# Patient Record
Sex: MALE | Race: WHITE | Employment: OTHER | URBAN - METROPOLITAN AREA
[De-identification: names, ages, dates, MRNs, and addresses within clinical notes are randomized per-mention and may not be internally consistent; named-entity substitution may affect disease eponyms.]

---

## 2018-08-22 ENCOUNTER — HOSPITAL ENCOUNTER (EMERGENCY)
Age: 63
Discharge: HOME | End: 2018-08-22
Attending: EMERGENCY MEDICINE
Payer: MEDICARE

## 2018-08-22 ENCOUNTER — APPOINTMENT (OUTPATIENT)
Dept: GENERAL RADIOLOGY | Age: 63
End: 2018-08-22
Payer: MEDICARE

## 2018-08-22 VITALS
DIASTOLIC BLOOD PRESSURE: 80 MMHG | BODY MASS INDEX: 19.46 KG/M2 | RESPIRATION RATE: 16 BRPM | WEIGHT: 124 LBS | SYSTOLIC BLOOD PRESSURE: 146 MMHG | HEIGHT: 67 IN | TEMPERATURE: 98.5 F | OXYGEN SATURATION: 95 % | HEART RATE: 73 BPM

## 2018-08-22 DIAGNOSIS — R10.13 ABDOMINAL PAIN, EPIGASTRIC: Primary | ICD-10-CM

## 2018-08-22 LAB
A/G RATIO: 1.2 (ref 1.1–2.2)
ALBUMIN SERPL-MCNC: 4.5 G/DL (ref 3.4–5)
ALP BLD-CCNC: 106 U/L (ref 40–129)
ALT SERPL-CCNC: <5 U/L (ref 10–40)
ANION GAP SERPL CALCULATED.3IONS-SCNC: 14 MMOL/L (ref 3–16)
AST SERPL-CCNC: 10 U/L (ref 15–37)
BASOPHILS ABSOLUTE: 0.2 K/UL (ref 0–0.2)
BASOPHILS RELATIVE PERCENT: 1 %
BILIRUB SERPL-MCNC: <0.2 MG/DL (ref 0–1)
BILIRUBIN URINE: NEGATIVE
BLOOD, URINE: ABNORMAL
BUN BLDV-MCNC: 17 MG/DL (ref 7–20)
CALCIUM SERPL-MCNC: 9.1 MG/DL (ref 8.3–10.6)
CHLORIDE BLD-SCNC: 103 MMOL/L (ref 99–110)
CLARITY: CLEAR
CO2: 22 MMOL/L (ref 21–32)
COLOR: YELLOW
CREAT SERPL-MCNC: 0.7 MG/DL (ref 0.8–1.3)
EOSINOPHILS ABSOLUTE: 0 K/UL (ref 0–0.6)
EOSINOPHILS RELATIVE PERCENT: 0 %
EPITHELIAL CELLS, UA: ABNORMAL /HPF
GFR AFRICAN AMERICAN: >60
GFR NON-AFRICAN AMERICAN: >60
GLOBULIN: 3.7 G/DL
GLUCOSE BLD-MCNC: 122 MG/DL (ref 70–99)
GLUCOSE URINE: NEGATIVE MG/DL
HCT VFR BLD CALC: 44 % (ref 40.5–52.5)
HEMOGLOBIN: 14.8 G/DL (ref 13.5–17.5)
KETONES, URINE: NEGATIVE MG/DL
LEUKOCYTE ESTERASE, URINE: NEGATIVE
LIPASE: 17 U/L (ref 13–60)
LYMPHOCYTES ABSOLUTE: 2.9 K/UL (ref 1–5.1)
LYMPHOCYTES RELATIVE PERCENT: 19.4 %
MCH RBC QN AUTO: 29.4 PG (ref 26–34)
MCHC RBC AUTO-ENTMCNC: 33.6 G/DL (ref 31–36)
MCV RBC AUTO: 87.3 FL (ref 80–100)
MICROSCOPIC EXAMINATION: YES
MONOCYTES ABSOLUTE: 0.7 K/UL (ref 0–1.3)
MONOCYTES RELATIVE PERCENT: 4.7 %
MUCUS: ABNORMAL /LPF
NEUTROPHILS ABSOLUTE: 11.1 K/UL (ref 1.7–7.7)
NEUTROPHILS RELATIVE PERCENT: 74.9 %
NITRITE, URINE: NEGATIVE
PDW BLD-RTO: 15.7 % (ref 12.4–15.4)
PH UA: 6
PLATELET # BLD: 376 K/UL (ref 135–450)
PMV BLD AUTO: 6.5 FL (ref 5–10.5)
POTASSIUM SERPL-SCNC: 3.5 MMOL/L (ref 3.5–5.1)
PROTEIN UA: ABNORMAL MG/DL
RBC # BLD: 5.04 M/UL (ref 4.2–5.9)
RBC UA: ABNORMAL /HPF (ref 0–2)
SODIUM BLD-SCNC: 139 MMOL/L (ref 136–145)
SPECIFIC GRAVITY UA: >=1.03
TOTAL PROTEIN: 8.2 G/DL (ref 6.4–8.2)
TROPONIN: <0.01 NG/ML
URINE REFLEX TO CULTURE: ABNORMAL
URINE TYPE: ABNORMAL
UROBILINOGEN, URINE: 0.2 E.U./DL
WBC # BLD: 14.8 K/UL (ref 4–11)
WBC UA: ABNORMAL /HPF (ref 0–5)

## 2018-08-22 PROCEDURE — 93005 ELECTROCARDIOGRAM TRACING: CPT | Performed by: NURSE PRACTITIONER

## 2018-08-22 PROCEDURE — 2580000003 HC RX 258: Performed by: NURSE PRACTITIONER

## 2018-08-22 PROCEDURE — 84484 ASSAY OF TROPONIN QUANT: CPT

## 2018-08-22 PROCEDURE — 96361 HYDRATE IV INFUSION ADD-ON: CPT

## 2018-08-22 PROCEDURE — 71046 X-RAY EXAM CHEST 2 VIEWS: CPT

## 2018-08-22 PROCEDURE — S0028 INJECTION, FAMOTIDINE, 20 MG: HCPCS | Performed by: NURSE PRACTITIONER

## 2018-08-22 PROCEDURE — 96375 TX/PRO/DX INJ NEW DRUG ADDON: CPT

## 2018-08-22 PROCEDURE — 85025 COMPLETE CBC W/AUTO DIFF WBC: CPT

## 2018-08-22 PROCEDURE — 6360000002 HC RX W HCPCS: Performed by: NURSE PRACTITIONER

## 2018-08-22 PROCEDURE — 83690 ASSAY OF LIPASE: CPT

## 2018-08-22 PROCEDURE — 96374 THER/PROPH/DIAG INJ IV PUSH: CPT

## 2018-08-22 PROCEDURE — 99284 EMERGENCY DEPT VISIT MOD MDM: CPT

## 2018-08-22 PROCEDURE — 81001 URINALYSIS AUTO W/SCOPE: CPT

## 2018-08-22 PROCEDURE — 80053 COMPREHEN METABOLIC PANEL: CPT

## 2018-08-22 PROCEDURE — 2500000003 HC RX 250 WO HCPCS: Performed by: NURSE PRACTITIONER

## 2018-08-22 RX ORDER — 0.9 % SODIUM CHLORIDE 0.9 %
1000 INTRAVENOUS SOLUTION INTRAVENOUS ONCE
Status: COMPLETED | OUTPATIENT
Start: 2018-08-22 | End: 2018-08-22

## 2018-08-22 RX ORDER — SUCRALFATE 1 G/1
1 TABLET ORAL 4 TIMES DAILY
Qty: 120 TABLET | Refills: 3 | Status: SHIPPED | OUTPATIENT
Start: 2018-08-22

## 2018-08-22 RX ORDER — ONDANSETRON 4 MG/1
4 TABLET, FILM COATED ORAL EVERY 8 HOURS PRN
Qty: 20 TABLET | Refills: 0 | Status: SHIPPED | OUTPATIENT
Start: 2018-08-22

## 2018-08-22 RX ORDER — DICYCLOMINE HYDROCHLORIDE 10 MG/1
10 CAPSULE ORAL
Qty: 120 CAPSULE | Refills: 3 | Status: SHIPPED | OUTPATIENT
Start: 2018-08-22

## 2018-08-22 RX ORDER — METOCLOPRAMIDE HYDROCHLORIDE 5 MG/ML
10 INJECTION INTRAMUSCULAR; INTRAVENOUS ONCE
Status: COMPLETED | OUTPATIENT
Start: 2018-08-22 | End: 2018-08-22

## 2018-08-22 RX ADMIN — SODIUM CHLORIDE 1000 ML: 9 INJECTION, SOLUTION INTRAVENOUS at 20:02

## 2018-08-22 RX ADMIN — FAMOTIDINE 20 MG: 10 INJECTION, SOLUTION INTRAVENOUS at 20:03

## 2018-08-22 RX ADMIN — METOCLOPRAMIDE 10 MG: 5 INJECTION, SOLUTION INTRAMUSCULAR; INTRAVENOUS at 20:03

## 2018-08-22 ASSESSMENT — PAIN SCALES - GENERAL: PAINLEVEL_OUTOF10: 9

## 2018-08-22 ASSESSMENT — PAIN DESCRIPTION - LOCATION: LOCATION: ABDOMEN

## 2018-08-22 ASSESSMENT — PAIN DESCRIPTION - FREQUENCY: FREQUENCY: CONTINUOUS

## 2018-08-22 ASSESSMENT — ENCOUNTER SYMPTOMS
DIARRHEA: 1
SHORTNESS OF BREATH: 0
NAUSEA: 1
ABDOMINAL PAIN: 1
VOMITING: 1

## 2018-08-22 ASSESSMENT — PAIN DESCRIPTION - DESCRIPTORS: DESCRIPTORS: CONSTANT;PRESSURE

## 2018-08-22 ASSESSMENT — PAIN DESCRIPTION - PAIN TYPE: TYPE: ACUTE PAIN

## 2018-08-23 LAB
EKG ATRIAL RATE: 71 BPM
EKG DIAGNOSIS: NORMAL
EKG P AXIS: 7 DEGREES
EKG P-R INTERVAL: 166 MS
EKG Q-T INTERVAL: 416 MS
EKG QRS DURATION: 86 MS
EKG QTC CALCULATION (BAZETT): 452 MS
EKG R AXIS: 40 DEGREES
EKG T AXIS: 63 DEGREES
EKG VENTRICULAR RATE: 71 BPM

## 2018-08-23 PROCEDURE — 93010 ELECTROCARDIOGRAM REPORT: CPT | Performed by: INTERNAL MEDICINE

## 2018-08-23 NOTE — ED PROVIDER NOTES
Emergency Department Physician who either signs or Co-signs this chart in the absence of a cardiologist.  Please see their note for interpretation of EKG. RADIOLOGY:   Non-plain film images such as CT, Ultrasound and MRI are read by the radiologist. Plain radiographic images are visualized and preliminarily interpreted by the  ED Provider with the below findings:    Chest x-ray interpreted by radiologist for chronic interstitial opacities are identified. No acute focal infiltrate is found. No pneumothorax. No free air. No acute bony abnormality. Heart and mediastinal contours are unremarkable. Interpretation per the Radiologist below, if available at the time of this note:    XR CHEST STANDARD (2 VW)   Final Result   No acute abnormality detected. No results found. PROCEDURES   Unless otherwise noted below, none     Procedures    CRITICAL CARE TIME   N/A    CONSULTS:  None      EMERGENCY DEPARTMENT COURSE and DIFFERENTIAL DIAGNOSIS/MDM:   Vitals:    Vitals:    08/22/18 1856   BP: (!) 146/80   Pulse: 73   Resp: 16   Temp: 98.5 °F (36.9 °C)   TempSrc: Oral   SpO2: 95%   Weight: 124 lb (56.2 kg)   Height: 5' 6.5\" (1.689 m)       Patient was given the following medications:  Medications   metoclopramide (REGLAN) injection 10 mg (10 mg Intravenous Given 8/22/18 2003)   famotidine (PEPCID) injection 20 mg (20 mg Intravenous Given 8/22/18 2003)   0.9 % sodium chloride bolus (0 mLs Intravenous Stopped 8/22/18 2102)       Patient was seen and evaluated by myself and Dr. Linda Armendariz. Patient here today for abdominal pain. Patient reports that his belly pain started at 1:30 this afternoon. He denies any fever but states he has been nauseous and vomited twice. Patient also reports it is been out of his methadone for the last 6 days. On exam he is awake and alert hemodynamically stable nontoxic in appearance. Lab values have all been reviewed and interpreted.   Patient did have a small amount of hematuria. Patient will be discharged home with epigastric discomfort. He was given IV fluids Reglan and Pepcid in the ED. He will be discharged home with Bentyl and Zofran and Carafate. Patient is frightened with the PCP referral and a GI referral.  He is encouraged to follow up and establish care at both. He was also encouraged to return the ED for any worsening symptoms. Patient was discharged home with all questions answered. The patient tolerated their visit well. They were seen and evaluated by the attending physician, No att. providers found who agreed with the assessment and plan. The patient and / or the family were informed of the results of any tests, a time was given to answer questions, a plan was proposed and they agreed with plan. FINAL IMPRESSION      1.  Abdominal pain, epigastric          DISPOSITION/PLAN   DISPOSITION Decision To Discharge 08/22/2018 10:21:55 PM      PATIENT REFERRED TO:  Benito Ayala  [phone]  Schedule an appointment as soon as possible for a visit in 1 week  to establish primary care    [facility] ED  [address]  [phone]    If symptoms worsen    Emilie Marquez MD  [address], [address] [facility] [address] [place] [address]    Schedule an appointment as soon as possible for a visit in 1 week  for re-evaluation      DISCHARGE MEDICATIONS:  Discharge Medication List as of 8/22/2018  9:22 PM      START taking these medications    Details   dicyclomine (BENTYL) 10 MG capsule Take 1 capsule by mouth 4 times daily (before meals and nightly), Disp-120 capsule, R-3Print      ondansetron (ZOFRAN) 4 MG tablet Take 1 tablet by mouth every 8 hours as needed for Nausea, Disp-20 tablet, R-0Print      sucralfate (CARAFATE) 1 GM tablet Take 1 tablet by mouth 4 times daily, Disp-120 tablet, R-3Print             DISCONTINUED MEDICATIONS:  Discharge Medication List as of 8/22/2018  9:22 PM                 (Please note that portions of this note were completed with a voice recognition program.  Efforts were made to edit the dictations but occasionally words are mis-transcribed.)    TADEO Carter CNP (electronically signed)     TADEO Carter CNP  08/23/18 5528

## 2018-08-23 NOTE — ED PROVIDER NOTES
I have personally performed and/or participated in the history, exam and medical decision making and agree with all pertinent clinical information. I have also reviewed and agree with the past medical, family and social history unless otherwise noted.      Bertram Gonzalez MD  08/23/18 4643

## 2022-10-05 ENCOUNTER — HOSPITAL ENCOUNTER (OUTPATIENT)
Dept: GENERAL RADIOLOGY | Age: 67
Discharge: HOME | End: 2022-10-05
Payer: MEDICARE

## 2022-10-05 ENCOUNTER — HOSPITAL ENCOUNTER (OUTPATIENT)
Age: 67
Discharge: HOME | End: 2022-10-05
Payer: MEDICARE

## 2022-10-05 DIAGNOSIS — R06.02 SOB (SHORTNESS OF BREATH): ICD-10-CM

## 2022-10-05 PROCEDURE — 71046 X-RAY EXAM CHEST 2 VIEWS: CPT

## 2022-12-24 ENCOUNTER — APPOINTMENT (OUTPATIENT)
Dept: CT IMAGING | Age: 67
End: 2022-12-24
Payer: MEDICARE

## 2022-12-24 ENCOUNTER — HOSPITAL ENCOUNTER (EMERGENCY)
Age: 67
Discharge: ANOTHER HOSPITAL | End: 2022-12-24
Attending: STUDENT IN AN ORGANIZED HEALTH CARE EDUCATION/TRAINING PROGRAM
Payer: MEDICARE

## 2022-12-24 ENCOUNTER — ANCILLARY PROCEDURE (OUTPATIENT)
Dept: EMERGENCY DEPT | Age: 67
End: 2022-12-24
Payer: MEDICARE

## 2022-12-24 ENCOUNTER — APPOINTMENT (OUTPATIENT)
Dept: GENERAL RADIOLOGY | Age: 67
End: 2022-12-24
Payer: MEDICARE

## 2022-12-24 VITALS
TEMPERATURE: 97.6 F | DIASTOLIC BLOOD PRESSURE: 65 MMHG | WEIGHT: 130 LBS | HEIGHT: 65 IN | OXYGEN SATURATION: 99 % | SYSTOLIC BLOOD PRESSURE: 103 MMHG | RESPIRATION RATE: 15 BRPM | HEART RATE: 77 BPM | BODY MASS INDEX: 21.66 KG/M2

## 2022-12-24 DIAGNOSIS — R74.8 ELEVATED CK: ICD-10-CM

## 2022-12-24 DIAGNOSIS — S22.20XA CLOSED FRACTURE OF STERNUM, UNSPECIFIED PORTION OF STERNUM, INITIAL ENCOUNTER: ICD-10-CM

## 2022-12-24 DIAGNOSIS — R77.8 ELEVATED TROPONIN: ICD-10-CM

## 2022-12-24 DIAGNOSIS — R79.89 ELEVATED BRAIN NATRIURETIC PEPTIDE (BNP) LEVEL: ICD-10-CM

## 2022-12-24 DIAGNOSIS — S22.000A COMPRESSION FX, THORACIC SPINE, CLOSED, INITIAL ENCOUNTER (HCC): ICD-10-CM

## 2022-12-24 DIAGNOSIS — E16.2 HYPOGLYCEMIA: ICD-10-CM

## 2022-12-24 DIAGNOSIS — R07.9 CHEST PAIN, UNSPECIFIED TYPE: ICD-10-CM

## 2022-12-24 DIAGNOSIS — R74.01 TRANSAMINITIS: ICD-10-CM

## 2022-12-24 DIAGNOSIS — S22.42XA CLOSED FRACTURE OF MULTIPLE RIBS OF LEFT SIDE, INITIAL ENCOUNTER: Primary | ICD-10-CM

## 2022-12-24 LAB
A/G RATIO: 1 (ref 1.1–2.2)
ALBUMIN SERPL-MCNC: 3.2 G/DL (ref 3.4–5)
ALP BLD-CCNC: 110 U/L (ref 40–129)
ALT SERPL-CCNC: 295 U/L (ref 10–40)
ANION GAP SERPL CALCULATED.3IONS-SCNC: 11 MMOL/L (ref 3–16)
AST SERPL-CCNC: 238 U/L (ref 15–37)
BASE EXCESS VENOUS: 3.6 MMOL/L (ref -3–3)
BASOPHILS ABSOLUTE: 0.1 K/UL (ref 0–0.2)
BASOPHILS RELATIVE PERCENT: 0.6 %
BILIRUB SERPL-MCNC: 0.9 MG/DL (ref 0–1)
BUN BLDV-MCNC: 33 MG/DL (ref 7–20)
CALCIUM SERPL-MCNC: 8.7 MG/DL (ref 8.3–10.6)
CARBOXYHEMOGLOBIN: 6 % (ref 0–1.5)
CHLORIDE BLD-SCNC: 103 MMOL/L (ref 99–110)
CO2: 30 MMOL/L (ref 21–32)
CREAT SERPL-MCNC: 0.9 MG/DL (ref 0.8–1.3)
EOSINOPHILS ABSOLUTE: 0 K/UL (ref 0–0.6)
EOSINOPHILS RELATIVE PERCENT: 0.2 %
ETHANOL: NORMAL MG/DL (ref 0–0.08)
GFR SERPL CREATININE-BSD FRML MDRD: >60 ML/MIN/{1.73_M2}
GLUCOSE BLD-MCNC: 120 MG/DL
GLUCOSE BLD-MCNC: 120 MG/DL (ref 70–99)
GLUCOSE BLD-MCNC: 61 MG/DL (ref 70–99)
HCO3 VENOUS: 28.3 MMOL/L (ref 23–29)
HCT VFR BLD CALC: 38.8 % (ref 40.5–52.5)
HEMOGLOBIN: 12.2 G/DL (ref 13.5–17.5)
INFLUENZA A: NOT DETECTED
INFLUENZA B: NOT DETECTED
LACTIC ACID: 1.5 MMOL/L (ref 0.4–2)
LYMPHOCYTES ABSOLUTE: 1.2 K/UL (ref 1–5.1)
LYMPHOCYTES RELATIVE PERCENT: 11.2 %
MAGNESIUM: 1.5 MG/DL (ref 1.8–2.4)
MCH RBC QN AUTO: 28.1 PG (ref 26–34)
MCHC RBC AUTO-ENTMCNC: 31.5 G/DL (ref 31–36)
MCV RBC AUTO: 89.2 FL (ref 80–100)
METHEMOGLOBIN VENOUS: 0.3 %
MONOCYTES ABSOLUTE: 0.6 K/UL (ref 0–1.3)
MONOCYTES RELATIVE PERCENT: 5.5 %
NEUTROPHILS ABSOLUTE: 9.1 K/UL (ref 1.7–7.7)
NEUTROPHILS RELATIVE PERCENT: 82.5 %
O2 CONTENT, VEN: 15 VOL %
O2 SAT, VEN: 90 %
O2 THERAPY: ABNORMAL
PCO2, VEN: 43.1 MMHG (ref 40–50)
PDW BLD-RTO: 17.1 % (ref 12.4–15.4)
PERFORMED ON: ABNORMAL
PH VENOUS: 7.43 (ref 7.35–7.45)
PHOSPHORUS: 2.1 MG/DL (ref 2.5–4.9)
PLATELET # BLD: 208 K/UL (ref 135–450)
PMV BLD AUTO: 7 FL (ref 5–10.5)
PO2, VEN: 55.2 MMHG (ref 25–40)
POTASSIUM REFLEX MAGNESIUM: 3.6 MMOL/L (ref 3.5–5.1)
PRO-BNP: ABNORMAL PG/ML (ref 0–124)
RBC # BLD: 4.35 M/UL (ref 4.2–5.9)
SARS-COV-2 RNA, RT PCR: NOT DETECTED
SODIUM BLD-SCNC: 144 MMOL/L (ref 136–145)
TCO2 CALC VENOUS: 30 MMOL/L
TOTAL CK: 1041 U/L (ref 39–308)
TOTAL PROTEIN: 6.3 G/DL (ref 6.4–8.2)
TROPONIN: 0.17 NG/ML
TROPONIN: 0.17 NG/ML
TSH SERPL DL<=0.05 MIU/L-ACNC: 4.25 UIU/ML (ref 0.27–4.2)
WBC # BLD: 11 K/UL (ref 4–11)

## 2022-12-24 PROCEDURE — 70450 CT HEAD/BRAIN W/O DYE: CPT

## 2022-12-24 PROCEDURE — 6360000004 HC RX CONTRAST MEDICATION: Performed by: STUDENT IN AN ORGANIZED HEALTH CARE EDUCATION/TRAINING PROGRAM

## 2022-12-24 PROCEDURE — 84100 ASSAY OF PHOSPHORUS: CPT

## 2022-12-24 PROCEDURE — 82550 ASSAY OF CK (CPK): CPT

## 2022-12-24 PROCEDURE — 72131 CT LUMBAR SPINE W/O DYE: CPT

## 2022-12-24 PROCEDURE — 93005 ELECTROCARDIOGRAM TRACING: CPT | Performed by: STUDENT IN AN ORGANIZED HEALTH CARE EDUCATION/TRAINING PROGRAM

## 2022-12-24 PROCEDURE — 72128 CT CHEST SPINE W/O DYE: CPT

## 2022-12-24 PROCEDURE — 85025 COMPLETE CBC W/AUTO DIFF WBC: CPT

## 2022-12-24 PROCEDURE — 87636 SARSCOV2 & INF A&B AMP PRB: CPT

## 2022-12-24 PROCEDURE — 84443 ASSAY THYROID STIM HORMONE: CPT

## 2022-12-24 PROCEDURE — 96375 TX/PRO/DX INJ NEW DRUG ADDON: CPT

## 2022-12-24 PROCEDURE — 83605 ASSAY OF LACTIC ACID: CPT

## 2022-12-24 PROCEDURE — 71260 CT THORAX DX C+: CPT | Performed by: STUDENT IN AN ORGANIZED HEALTH CARE EDUCATION/TRAINING PROGRAM

## 2022-12-24 PROCEDURE — 74177 CT ABD & PELVIS W/CONTRAST: CPT

## 2022-12-24 PROCEDURE — 36415 COLL VENOUS BLD VENIPUNCTURE: CPT

## 2022-12-24 PROCEDURE — 80053 COMPREHEN METABOLIC PANEL: CPT

## 2022-12-24 PROCEDURE — 6370000000 HC RX 637 (ALT 250 FOR IP): Performed by: STUDENT IN AN ORGANIZED HEALTH CARE EDUCATION/TRAINING PROGRAM

## 2022-12-24 PROCEDURE — 71101 X-RAY EXAM UNILAT RIBS/CHEST: CPT

## 2022-12-24 PROCEDURE — 84484 ASSAY OF TROPONIN QUANT: CPT

## 2022-12-24 PROCEDURE — 6360000002 HC RX W HCPCS: Performed by: STUDENT IN AN ORGANIZED HEALTH CARE EDUCATION/TRAINING PROGRAM

## 2022-12-24 PROCEDURE — 96366 THER/PROPH/DIAG IV INF ADDON: CPT

## 2022-12-24 PROCEDURE — 83880 ASSAY OF NATRIURETIC PEPTIDE: CPT

## 2022-12-24 PROCEDURE — 82803 BLOOD GASES ANY COMBINATION: CPT

## 2022-12-24 PROCEDURE — 2580000003 HC RX 258: Performed by: STUDENT IN AN ORGANIZED HEALTH CARE EDUCATION/TRAINING PROGRAM

## 2022-12-24 PROCEDURE — 80074 ACUTE HEPATITIS PANEL: CPT

## 2022-12-24 PROCEDURE — 96365 THER/PROPH/DIAG IV INF INIT: CPT

## 2022-12-24 PROCEDURE — 82077 ASSAY SPEC XCP UR&BREATH IA: CPT

## 2022-12-24 PROCEDURE — 93308 TTE F-UP OR LMTD: CPT

## 2022-12-24 PROCEDURE — 83735 ASSAY OF MAGNESIUM: CPT

## 2022-12-24 PROCEDURE — 99285 EMERGENCY DEPT VISIT HI MDM: CPT

## 2022-12-24 RX ORDER — MAGNESIUM SULFATE 1 G/100ML
1000 INJECTION INTRAVENOUS ONCE
Status: COMPLETED | OUTPATIENT
Start: 2022-12-24 | End: 2022-12-24

## 2022-12-24 RX ORDER — LIDOCAINE 4 G/G
1 PATCH TOPICAL ONCE
Status: DISCONTINUED | OUTPATIENT
Start: 2022-12-24 | End: 2022-12-24 | Stop reason: HOSPADM

## 2022-12-24 RX ORDER — ASPIRIN 325 MG
325 TABLET ORAL ONCE
Status: COMPLETED | OUTPATIENT
Start: 2022-12-24 | End: 2022-12-24

## 2022-12-24 RX ORDER — TIZANIDINE 4 MG/1
10 TABLET ORAL EVERY 6 HOURS PRN
COMMUNITY

## 2022-12-24 RX ORDER — DEXTROSE MONOHYDRATE 25 G/50ML
12.5 INJECTION, SOLUTION INTRAVENOUS ONCE
Status: DISCONTINUED | OUTPATIENT
Start: 2022-12-24 | End: 2022-12-24

## 2022-12-24 RX ORDER — MORPHINE SULFATE 2 MG/ML
2 INJECTION, SOLUTION INTRAMUSCULAR; INTRAVENOUS ONCE
Status: COMPLETED | OUTPATIENT
Start: 2022-12-24 | End: 2022-12-24

## 2022-12-24 RX ADMIN — MORPHINE SULFATE 2 MG: 2 INJECTION, SOLUTION INTRAMUSCULAR; INTRAVENOUS at 17:42

## 2022-12-24 RX ADMIN — ASPIRIN 325 MG: 325 TABLET ORAL at 15:38

## 2022-12-24 RX ADMIN — IOPAMIDOL 85 ML: 755 INJECTION, SOLUTION INTRAVENOUS at 15:19

## 2022-12-24 RX ADMIN — MAGNESIUM SULFATE HEPTAHYDRATE 1000 MG: 1 INJECTION, SOLUTION INTRAVENOUS at 15:38

## 2022-12-24 RX ADMIN — DEXTROSE MONOHYDRATE 125 ML: 100 INJECTION, SOLUTION INTRAVENOUS at 15:35

## 2022-12-24 ASSESSMENT — PAIN - FUNCTIONAL ASSESSMENT: PAIN_FUNCTIONAL_ASSESSMENT: 0-10

## 2022-12-24 ASSESSMENT — PAIN DESCRIPTION - LOCATION
LOCATION: RIB CAGE
LOCATION: RIB CAGE
LOCATION: BACK

## 2022-12-24 ASSESSMENT — PAIN SCALES - GENERAL
PAINLEVEL_OUTOF10: 9
PAINLEVEL_OUTOF10: 8
PAINLEVEL_OUTOF10: 8

## 2022-12-24 ASSESSMENT — PAIN DESCRIPTION - DESCRIPTORS
DESCRIPTORS: SHOOTING
DESCRIPTORS: STABBING
DESCRIPTORS: STABBING

## 2022-12-24 ASSESSMENT — PAIN DESCRIPTION - ORIENTATION
ORIENTATION: LEFT
ORIENTATION: LEFT

## 2022-12-24 ASSESSMENT — PAIN DESCRIPTION - PAIN TYPE
TYPE: ACUTE PAIN
TYPE: ACUTE PAIN

## 2022-12-24 ASSESSMENT — PAIN DESCRIPTION - FREQUENCY: FREQUENCY: CONTINUOUS

## 2022-12-24 ASSESSMENT — LIFESTYLE VARIABLES: HOW OFTEN DO YOU HAVE A DRINK CONTAINING ALCOHOL: NEVER

## 2022-12-24 NOTE — PROGRESS NOTES
Called regarding this patient's care by Dr. Rani Gutierrez of the ED. Patient was having falls at home. He had complained of chest pain. Troponin found to be elevated at 0.17. Abnormal EKG. On review of EKG the patient appears to have a prior anterior infarct pattern with Q waves across the precordial leads and ST elevations on serial EKG. No reciprocal changes noted. On review of 2018 EKG, prior anterior infarct/Q waves across the anterior precordial leads was also noted. Pan scan shows concern for T-spine compression fractures, rib fractures, sternal fracture. I am told history is consistent with musculoskeletal pain and is reproducible on exam.  Patient is denying chest pressure/heaviness/tightness. We do not have repeat troponin as yet at this time. CPK is elevated, liver enzymes elevated. I have discussed EKGs and course with interventional cardiology. We feel that patient is not appropriate for Cath Lab at this time and that acute MI is unlikely given course to this point. I would withhold heparin at this point given trauma. He was given aspirin. Plan is transfer to [facility] for further evaluation for trauma. He may be assessed by cardiology group there as ischemic work-up would be indicated upon recovery. Please call with any additional questions or concerns.     Abilio Ashraf MD, [address] [facility]  [phone] [facility]  [phone] [facility]

## 2022-12-24 NOTE — ED PROVIDER NOTES
[address] ED      CHIEF COMPLAINT  Fatigue, falls       HISTORY OF PRESENT ILLNESS  Jane Simeon is a 75 y.o. male with a past medical history of arthritis and fibromyalgia, who presents to the ED for evaluation of fatigue and multiple falls. Patient reports he has had 4 falls over the past 6 weeks. He cannot provide an exact reason for these falls, states it is due to weakness. Patient only complaint of trauma is to his left ribs, he states that his ribs have hurt since a fall 2 weeks ago. He denies that he was on the ground for any extended period of time. He denies any falls since the fall 2 weeks ago. Reports aching pain, severe, worse with touching. Nonpleuritic. He did hit his head on a previous fall, denies loss of consciousness or blood thinner use. No vomiting. When EMS arrived to his house, the home was very cold, patient is reportedly not had heat for multiple days and only has a small space heater. There is also significant concern by EMS due to the house condition and the condition of the patient's pet-they will be contacting Adult Protective Services. Patient lives alone. He denies any shortness of breath, cough, fever, vomiting, dysuria, abdominal pain. Patient has not eaten in 3 days. Old records reviewed:  Patient does follow with [facility] for thoracic back pain    No other complaints, modifying factors or associated symptoms. I have reviewed the following from the nursing documentation. Past Medical History:   Diagnosis Date    Arthritis     Fibromyalgia      Past Surgical History:   Procedure Laterality Date    ADENOIDECTOMY      CARPAL TUNNEL RELEASE Bilateral     SHOULDER SURGERY Bilateral     TONSILLECTOMY       No family history on file.   Social History     Socioeconomic History    Marital status:      Spouse name: Not on file    Number of children: Not on file    Years of education: Not on file    Highest education level: Not on file Occupational History    Not on file   Tobacco Use    Smoking status: Every Day     Packs/day: 0.50     Years: 45.00     Pack years: 22.50     Types: Cigarettes    Smokeless tobacco: Never   Substance and Sexual Activity    Alcohol use: No    Drug use: No    Sexual activity: Not on file   Other Topics Concern    Not on file   Social History Narrative    Not on file     Social Determinants of Health     Financial Resource Strain: Not on file   Food Insecurity: Not on file   Transportation Needs: Not on file   Physical Activity: Not on file   Stress: Not on file   Social Connections: Not on file   Intimate Partner Violence: Not on file   Housing Stability: Not on file     Current Facility-Administered Medications   Medication Dose Route Frequency Provider Last Rate Last Admin    sodium phosphate 9.45 mmol in sodium chloride 0.9 % 250 mL IVPB  0.16 mmol/kg IntraVENous PRN Natalie Santana MD        Or    sodium phosphate 18.87 mmol in sodium chloride 0.9 % 250 mL IVPB  0.32 mmol/kg IntraVENous PRN Natalie Santana MD        lidocaine 4 % external patch 1 patch  1 patch TransDERmal Once Natalie Santana MD        morphine (PF) injection 2 mg  2 mg IntraVENous Once Natalie Santana MD         Current Outpatient Medications   Medication Sig Dispense Refill    tiZANidine (ZANAFLEX) 4 MG tablet Take 10 mg by mouth every 6 hours as needed      dicyclomine (BENTYL) 10 MG capsule Take 1 capsule by mouth 4 times daily (before meals and nightly) (Patient not taking: Reported on 12/24/2022) 120 capsule 3    ondansetron (ZOFRAN) 4 MG tablet Take 1 tablet by mouth every 8 hours as needed for Nausea 20 tablet 0    sucralfate (CARAFATE) 1 GM tablet Take 1 tablet by mouth 4 times daily (Patient not taking: Reported on 12/24/2022) 120 tablet 3    methadone (DOLOPHINE) 10 MG tablet Take 10 mg by mouth three times daily      ibuprofen (ADVIL;MOTRIN) 200 MG tablet Take 200 mg by mouth every 6 hours as needed for Pain      ibuprofen (ADVIL;MOTRIN) 800 MG tablet Take 1 tablet by mouth every 8 hours as needed for Pain 30 tablet 0     No Known Allergies    REVIEW OF SYSTEMS  All systems reviewed, pertinent positives per HPI otherwise noted to be negative. PHYSICAL EXAM  GENERAL APPEARANCE: Elzbieta Salazar is in no acute respiratory distress. Awake and alert. Ill-appearing. Answers questions appropriately. Generalized weakness, has difficulty lifting himself off the bed  VITAL SIGNS: /79   Pulse 97   Temp 97.6 °F (36.4 °C) (Oral)   Resp 16   Ht 5' 5\" (1.651 m)   Wt 130 lb (59 kg)   SpO2 94%   BMI 21.63 kg/m²   HEENT: Normocephalic, atraumatic. No Mijaress sign or Raccoon Eyes. No depressed skull fractures. Extraocular muscles are intact. Pupils equal round and reactive to light. Conjunctivas are pink. Negative scleral icterus. No epistaxis. No septal hematomas. No hemotympanum. Mucous membranes are moist. Tongue in the midline. Pharynx without erythema or exudates. No uvular deviation. NECK: Nontender and supple. No stridor or meningismus. Trachea in the midine. Cervical spine is non-tender to palpation in the midline. No abrasions. CHEST: Nontender to palpation. Clear to auscultation bilaterally. No rales, rhonchi, or wheezing. No abrasions. HEART: Regular rate and rhythm. No murmurs, gallops or rubs. Strong and equal pulses in the upper and lower extremities. ABDOMEN: Soft, nontender, nondistended, positive bowel sounds, no palpable pulsatile masses. No abrasions. MUSCULOSKELETAL:  thoracic, and lumbosacral spines are tender to palpation. Theres no edema, bruising, or step-offs. Upper and lower extremities have no deformities and they are non-tender to palpation. The calves are nontender to palpation. Active range of motion. Negative bilateral straight leg raises. Pelvis is stable and nontender. NEUROLOGICAL: Awake, alert and oriented x 3.   Patient has generalized weakness but able to lift the upper and lower extremities off the bed.  Sensation is intact to light touch in the upper and lower extremities. No saddle anesthesia. GCS 15. IMMUNOLOGICAL: No palpable lymphadenopathy or lymphatic streaking. DERMATOLOGIC: No petechiae, rashes, or ecchymoses. No lacerations or abrasions. LABS  I have reviewed all labs for this visit.    Results for orders placed or performed during the hospital encounter of 12/24/22   COVID-19 & Influenza Combo    Specimen: Nasopharyngeal Swab   Result Value Ref Range    SARS-CoV-2 RNA, RT PCR NOT DETECTED NOT DETECTED    INFLUENZA A NOT DETECTED NOT DETECTED    INFLUENZA B NOT DETECTED NOT DETECTED   CBC with Auto Differential   Result Value Ref Range    WBC 11.0 4.0 - 11.0 K/uL    RBC 4.35 4.20 - 5.90 M/uL    Hemoglobin 12.2 (L) 13.5 - 17.5 g/dL    Hematocrit 38.8 (L) 40.5 - 52.5 %    MCV 89.2 80.0 - 100.0 fL    MCH 28.1 26.0 - 34.0 pg    MCHC 31.5 31.0 - 36.0 g/dL    RDW 17.1 (H) 12.4 - 15.4 %    Platelets 705 234 - 023 K/uL    MPV 7.0 5.0 - 10.5 fL    Neutrophils % 82.5 %    Lymphocytes % 11.2 %    Monocytes % 5.5 %    Eosinophils % 0.2 %    Basophils % 0.6 %    Neutrophils Absolute 9.1 (H) 1.7 - 7.7 K/uL    Lymphocytes Absolute 1.2 1.0 - 5.1 K/uL    Monocytes Absolute 0.6 0.0 - 1.3 K/uL    Eosinophils Absolute 0.0 0.0 - 0.6 K/uL    Basophils Absolute 0.1 0.0 - 0.2 K/uL   CMP w/ Reflex to MG   Result Value Ref Range    Sodium 144 136 - 145 mmol/L    Potassium reflex Magnesium 3.6 3.5 - 5.1 mmol/L    Chloride 103 99 - 110 mmol/L    CO2 30 21 - 32 mmol/L    Anion Gap 11 3 - 16    Glucose 61 (L) 70 - 99 mg/dL    BUN 33 (H) 7 - 20 mg/dL    Creatinine 0.9 0.8 - 1.3 mg/dL    Est, Glom Filt Rate >60 >60    Calcium 8.7 8.3 - 10.6 mg/dL    Total Protein 6.3 (L) 6.4 - 8.2 g/dL    Albumin 3.2 (L) 3.4 - 5.0 g/dL    Albumin/Globulin Ratio 1.0 (L) 1.1 - 2.2    Total Bilirubin 0.9 0.0 - 1.0 mg/dL    Alkaline Phosphatase 110 40 - 129 U/L     (H) 10 - 40 U/L     (H) 15 - 37 U/L   Troponin   Result Value Ref Range    Troponin 0.17 (H) <0.01 ng/mL   TSH   Result Value Ref Range    TSH 4.25 (H) 0.27 - 4.20 uIU/mL   Lactic Acid   Result Value Ref Range    Lactic Acid 1.5 0.4 - 2.0 mmol/L   Magnesium   Result Value Ref Range    Magnesium 1.50 (L) 1.80 - 2.40 mg/dL   Phosphorus   Result Value Ref Range    Phosphorus 2.1 (L) 2.5 - 4.9 mg/dL   Brain Natriuretic Peptide   Result Value Ref Range    Pro-BNP 12,750 (H) 0 - 124 pg/mL   Ethanol   Result Value Ref Range    Ethanol Lvl None Detected mg/dL   Troponin   Result Value Ref Range    Troponin 0.17 (H) <0.01 ng/mL   CK   Result Value Ref Range    Total CK 1,041 (H) 39 - 308 U/L   Blood gas, venous   Result Value Ref Range    pH, [place] 7.435 7.350 - 7.450    pCO2, [place] 43.1 40.0 - 50.0 mmHg    pO2, [place] 55.2 (H) 25.0 - 40.0 mmHg    HCO3, Venous 28.3 23.0 - 29.0 mmol/L    Base Excess, [place] 3.6 (H) -3.0 - 3.0 mmol/L    O2 Sat, [place] 90 Not Established %    Carboxyhemoglobin 6.0 (H) 0.0 - 1.5 %    MetHgb, [place]dom[place] 0.3 <1.5 %    TC02 (Calc), [place] 30 Not Established mmol/L    O2 Content, [place]dom[place] 15 Not Established VOL %    O2 Therapy Unknown          ECG  The Ekg interpreted by me shows  normal sinus rhythm with a rate of 100  Axis is   Left axis deviation  QTc is  663  Intervals and Durations are unremarkable. ST Segments: nonspecific changes  Nonspecific change from prior EKG dated 8/22/18    The Ekg interpreted by me shows  normal sinus rhythm with a rate of 97  Axis is   Left axis deviation  QTc is  551  Intervals and Durations are unremarkable. ST Segments: No acute change  Nonspecific change from prior EKG dated 12/24/22      RADIOLOGY    POC US ECHOCARDIO TRANSTHORACIC LTD   Final Result      CT CHEST PULMONARY EMBOLISM W CONTRAST   Final Result   No evidence of pulmonary embolism. Acute mildly displaced posterior left rib fractures as above. No acute pathology in the abdomen and pelvis. The bladder is very distended but otherwise unremarkable.          CT ABDOMEN PELVIS W IV CONTRAST Additional Contrast? None   Final Result   No evidence of pulmonary embolism. Acute mildly displaced posterior left rib fractures as above. No acute pathology in the abdomen and pelvis. The bladder is very distended but otherwise unremarkable. XR RIBS LEFT INCLUDE CHEST (MIN 3 VIEWS)   Final Result   Diffuse osteopenia with no acute bony abnormality. Mild cardiomegaly and mild increased interstitial markings throughout which   appears chronic with no acute infiltrate or effusion         CT LUMBAR SPINE WO CONTRAST   Final Result   T12 compression fracture of indeterminate age but likely acute. No canal   encroachment         CT THORACIC SPINE WO CONTRAST   Final Result   Compression fractures at T9, T10 and T12 of indeterminate age but possibly   acute. No retropulsed bony fragments or definite canal narrowing. CT HEAD WO CONTRAST   Final Result   No acute intracranial abnormality. ED COURSE / MDM  Patient seen and evaluated. Old records reviewed and pertinent information included in HPI. Labs and imaging reviewed and results discussed with patient. Overall ill appearing patient, presenting for fatigue and multiple falls. Physical exam remarkable for left-sided chest wall pain. Differential diagnosis includes but is not limited to: Failure to thrive, deconditioning, electrolyte abnormalities, fracture, ACS, pneumonia, pneumothorax    Workup showed:  ED Course as of 12/24/22 1730   Sat Dec 24, 2022   1351 The Ekg interpreted by me shows  normal sinus rhythm with a rate of 100  Axis is   Left axis deviation  QTc is  663  Intervals and Durations are unremarkable. ST Segments: nonspecific changes  Nonspecific change from prior EKG dated 8/22/18 [ER]   1438 Mild anemia to 12.2. No leukocytosis or thrombocytopenia [ER]   1444 Lactate within normal limits [ER]   1455 Flu and COVID swab negative [ER]   1456 Hypoglycemia to 61.   Glucose repletion given. Hypophosphatemia to 2.1, hypomagnesemia 1.5. [ER]   1500 Troponin is elevated to 0.17. Patient does report constant left-sided chest pain [ER]   1502 CT Head: IMPRESSION:  No acute intracranial abnormality. [ER]   0 CT T spine: IMPRESSION:  Compression fractures at T9, T10 and T12 of indeterminate age but possibly acute. No retropulsed bony fragments or definite canal narrowing. [ER]   1502 CT L spine: IMPRESSION:  T12 compression fracture of indeterminate age but likely acute. No canal encroachment [ER]   1502 TSH is elevated, T4 pending. [ER]   1505 Patient has transaminitis, no previous labs suggesting transaminitis in the past.  Patient is on methadone but denies any known history of hepatitis. He denies alcohol use. He denies acetaminophen use [ER]   1508 Patient's symptoms of constant chest pain for 2 weeks after a fall is not consistent with ACS. Patient's EKG shows no evidence of acute ischemia. However, troponin is elevated to 0.17. Will obtain CT imaging of the chest and belly to further evaluate lab abnormalities. [ER]   1520 XR L Ribs: IMPRESSION: Diffuse osteopenia with no acute bony abnormality. Mild cardiomegaly and mild increased interstitial markings throughout which appears chronic with no acute infiltrate or effusion [ER]   1524 Spoke to Dr. Tra Velazquez of cardiology. He felt that patient likely had an old infarct on his EKG, he did not feel the EKG looked significantly different than previous. He agreed with aspirin and stated that if CT scans did not show internal bleeding, patient could be heparinized. He did recommend repeat troponin and EKG and rediscussion after further information obtained. He did also recommend a CK level. [ER]   1525 BNP [place] elevated to [address].   Patient denies known history of heart failure but does have lower extremity edema and cardiomegaly [ER]   1525 Ethanol level negative [ER]   1553 CK is elevated to 1041 [ER]   1554 Patient with oxygen saturations now in the 80s, patient placed on 2 L of oxygen with improvement of oxygen saturation [ER]   1557 Blood gas shows no acidemia or hypercarbia [ER]   1608 CT PE/Abd/Pelvis: IMPRESSION:  No evidence of pulmonary embolism. Acute mildly displaced posterior left rib fractures as above. No acute pathology in the abdomen and pelvis. The bladder is very distended but otherwise unremarkable.  ---------------  Patient has acute posterior left 8th through 12th rib fractures. Acute mildly displaced mid sternal fracture. Patient reports he needs to urinate, will have patient urinate and then do bladder scan. No intra-abdominal hemorrhage noted. [ER]   1641 1208 Discussed again with Dr. Theresa Chu of cardiology. We discussed patient's traumatic injuries. He had discussed EKG with interventional cardiology, they felt that EKG did have some similarities to previous, in particular the Q waves and felt that this was less likely an acute MI. They did not feel that patient was a good candidate for the Cath Lab or heparinization at this time given his traumatic injuries. [ER]   1630 Delta troponin stable at 0.17 [ER]   1642 Patient accepted for transfer to [facility] emergency department by Dr. Suzette Mckinney. Patient is currently hemodynamically stable. Patient does have some evidence of rhabdomyolysis but also heart failure.   At this time, given hemodynamic stability, will hold off on fluids or Lasix and continue to monitor. [ER]   1705 Repeat glucose 120 [ER]   1710 Patient attempting to urinate, if unable, will place Brown [ER]      ED Course User Index  [ER] Lizbeth Gotti MD      During the patient's ED course, the patient was given:  Medications   sodium phosphate 9.45 mmol in sodium chloride 0.9 % 250 mL IVPB (has no administration in time range)     Or   sodium phosphate 18.87 mmol in sodium chloride 0.9 % 250 mL IVPB (has no administration in time range)   lidocaine 4 % external patch 1 patch (has no administration in time range)   morphine (PF) injection 2 mg (has no administration in time range)   magnesium sulfate 1000 mg in dextrose 5% 100 mL IVPB (1,000 mg IntraVENous New Bag 12/24/22 1538)   dextrose bolus 10% 125 mL (0 mLs IntraVENous Stopped 12/24/22 1551)   iopamidol (ISOVUE-370) 76 % injection 85 mL (85 mLs IntraVENous Given 12/24/22 1519)   aspirin tablet 325 mg (325 mg Oral Given 12/24/22 1538)      Is this patient to be included in the SEP-1 Core Measure due to severe sepsis or septic shock? No   Exclusion criteria - the patient is NOT to be included for SEP-1 Core Measure due to:  2+ SIRS criteria are not met    Trauma imaging remarkable for left posterior rib fractures 8 through 12. Mildly displaced sternal fracture and age indeterminant but likely acute T8, 10, and 12 compression fractures. Patient reports that his last fall occurred 2 weeks ago and he has been ambulatory since that time but having progressive generalized weakness. Patient will be transferred to [facility] for further management of polytrauma. CT scan did show that patient's bladder is full, he will attempt to urinate, denies difficulty urinating since the fall. If unable to urinate will place mcclain. Patient does not have saddle anesthesia. He has generalized weakness, able to lift his bilateral lower extremities off the bed. No lumbar spine injuries on CT. Patient has troponin elevation and abnormal EKG. Discussed extensively with cardiology, they felt that some of the EKG changes had been seen previously and EKG changes more likely to be remote infarct. Patient's chest pain does not sound like ACS. In particular, patient reports constant aching/sharp pain for the past 2 weeks that is worse with touching. Pain is reproducible on exam and patient does have traumatic injuries consistent with his pain. Patient did receive aspirin, cardiology did not recommend heparinization at this time.   Cardiologist Dr. Ortiz Nicely did discuss with interventional cardiology, but decision made that patient was not a cardiac cath candidate at this time. We will send EKGs with patient to [facility], ED is aware of elevated troponins. Delta troponin is stable. BNP is elevated to 12,000 and patient does have evidence of fluid overload on exam and cardiomegaly on chest x-ray. Bedside ultrasound did show reduced ventricular function. Will defer fluid administration at this time given signs of heart failure. Labs showed signs consistent with rhabdomyolysis with CK of ~1000. We will hold off on fluids given CK, while elevated is not severely elevated and patient with evidence of heart failure. Transaminitis may be related to rhabdomyolysis. Patient denies alcohol use or acetaminophen use. Hepatitis panel pending. Patient with multiple electrolyte abnormalities, repletion started in the emergency department. Do feel that patient's clinical picture is consistent with failure to thrive. I spent a total of 60 minutes of critical care time in obtaining history, performing a physical exam, bedside monitoring of interventions, collecting and interpreting tests and discussion with consultants but not including time spent performing procedures. Clinical Concern polytrauma, elevated troponin, rhabdomyolysis, elevated BNP, transaminitis    Intervention cardiology consultation, aspirin, pain control, trauma transfer    CLINICAL IMPRESSION  1. Closed fracture of multiple ribs of left side, initial encounter    2. Closed fracture of sternum, unspecified portion of sternum, initial encounter    3. Compression fx, thoracic spine, closed, initial encounter ([facility] [address]    4. Chest pain, unspecified type    5. Elevated troponin    6. Elevated CK    7. Transaminitis    8. Elevated brain natriuretic peptide (BNP) level    9.  Hypoglycemia        Blood pressure 124/82, pulse 100, temperature 97.6 °F (36.4 °C), temperature source Oral, resp. rate 20, height 5' 5\" (1.651 m), weight 130 lb (59 kg), SpO2 92 %. DISPOSITION  Tomáskandice Bardales signed out to oncoming provider while awaiting transfer in stable condition    DISCLAIMER: This chart was created using Dragon dictation software. Efforts were made by me to ensure accuracy, however some errors may be present due to limitations of this technology and occasionally words are not transcribed correctly.           Sailaja Cho MD  12/24/22 2107

## 2022-12-24 NOTE — ED NOTES
[phone] call placed to [facility] to begin new pt transfer [facility]  12/24/22 1632    1637 [facility] called back connecting Dr. Mata Connors with [address] [place] [address]  12/24/22 2174    0146 [facility] called back [facility] is upstairs will be down to transport pt to [address] [place] [address]  12/24/22 2277

## 2022-12-24 NOTE — ED NOTES
[phone] call placed to [address] [place] [address] cardiology      Aldean Nyhan  12/24/22 [phone] consult completed with call back from Dr. Theresa Chu speaking with Dr. Lauren Nicole  12/24/22 4831

## 2022-12-25 LAB
EKG ATRIAL RATE: 100 BPM
EKG ATRIAL RATE: 97 BPM
EKG DIAGNOSIS: NORMAL
EKG DIAGNOSIS: NORMAL
EKG P AXIS: 63 DEGREES
EKG P AXIS: 73 DEGREES
EKG P-R INTERVAL: 120 MS
EKG P-R INTERVAL: 126 MS
EKG Q-T INTERVAL: 434 MS
EKG Q-T INTERVAL: 514 MS
EKG QRS DURATION: 86 MS
EKG QRS DURATION: 90 MS
EKG QTC CALCULATION (BAZETT): 551 MS
EKG QTC CALCULATION (BAZETT): 663 MS
EKG R AXIS: -32 DEGREES
EKG R AXIS: -59 DEGREES
EKG T AXIS: 35 DEGREES
EKG T AXIS: 83 DEGREES
EKG VENTRICULAR RATE: 100 BPM
EKG VENTRICULAR RATE: 97 BPM
HAV IGM SER IA-ACNC: NORMAL
HEPATITIS B CORE IGM ANTIBODY: NORMAL
HEPATITIS B SURFACE ANTIGEN INTERPRETATION: NORMAL
HEPATITIS C ANTIBODY INTERPRETATION: NORMAL

## 2022-12-25 PROCEDURE — 93010 ELECTROCARDIOGRAM REPORT: CPT | Performed by: INTERNAL MEDICINE
